# Patient Record
Sex: MALE | Race: WHITE | NOT HISPANIC OR LATINO | URBAN - METROPOLITAN AREA
[De-identification: names, ages, dates, MRNs, and addresses within clinical notes are randomized per-mention and may not be internally consistent; named-entity substitution may affect disease eponyms.]

---

## 2020-01-03 PROBLEM — Z00.00 ENCOUNTER FOR PREVENTIVE HEALTH EXAMINATION: Status: ACTIVE | Noted: 2020-01-03

## 2020-01-06 ENCOUNTER — OUTPATIENT (OUTPATIENT)
Dept: OUTPATIENT SERVICES | Facility: HOSPITAL | Age: 55
LOS: 1 days | Discharge: HOME | End: 2020-01-06
Payer: COMMERCIAL

## 2020-01-06 DIAGNOSIS — C67.9 MALIGNANT NEOPLASM OF BLADDER, UNSPECIFIED: ICD-10-CM

## 2020-01-07 ENCOUNTER — LABORATORY RESULT (OUTPATIENT)
Age: 55
End: 2020-01-07

## 2020-01-07 ENCOUNTER — APPOINTMENT (OUTPATIENT)
Dept: HEMATOLOGY ONCOLOGY | Facility: CLINIC | Age: 55
End: 2020-01-07
Payer: COMMERCIAL

## 2020-01-07 ENCOUNTER — RESULT REVIEW (OUTPATIENT)
Age: 55
End: 2020-01-07

## 2020-01-07 VITALS
BODY MASS INDEX: 21.49 KG/M2 | HEIGHT: 71.26 IN | OXYGEN SATURATION: 98 % | SYSTOLIC BLOOD PRESSURE: 117 MMHG | RESPIRATION RATE: 16 BRPM | TEMPERATURE: 98.6 F | DIASTOLIC BLOOD PRESSURE: 73 MMHG | HEART RATE: 87 BPM | WEIGHT: 155.18 LBS

## 2020-01-07 DIAGNOSIS — Z80.0 FAMILY HISTORY OF MALIGNANT NEOPLASM OF DIGESTIVE ORGANS: ICD-10-CM

## 2020-01-07 DIAGNOSIS — Z85.048 PERSONAL HISTORY OF OTHER MALIGNANT NEOPLASM OF RECTUM, RECTOSIGMOID JUNCTION, AND ANUS: ICD-10-CM

## 2020-01-07 DIAGNOSIS — Z78.9 OTHER SPECIFIED HEALTH STATUS: ICD-10-CM

## 2020-01-07 LAB
BASOPHILS # BLD AUTO: 0.1 K/UL — SIGNIFICANT CHANGE UP (ref 0–0.2)
BASOPHILS NFR BLD AUTO: 1 % — SIGNIFICANT CHANGE UP (ref 0–2)
EOSINOPHIL # BLD AUTO: 0.1 K/UL — SIGNIFICANT CHANGE UP (ref 0–0.5)
EOSINOPHIL NFR BLD AUTO: 1.9 % — SIGNIFICANT CHANGE UP (ref 0–6)
HCT VFR BLD CALC: 38.6 % — LOW (ref 39–50)
HGB BLD-MCNC: 13.1 G/DL — SIGNIFICANT CHANGE UP (ref 13–17)
LYMPHOCYTES # BLD AUTO: 1.7 K/UL — SIGNIFICANT CHANGE UP (ref 1–3.3)
LYMPHOCYTES # BLD AUTO: 23.7 % — SIGNIFICANT CHANGE UP (ref 13–44)
MCHC RBC-ENTMCNC: 31.2 PG — SIGNIFICANT CHANGE UP (ref 27–34)
MCHC RBC-ENTMCNC: 34 G/DL — SIGNIFICANT CHANGE UP (ref 32–36)
MCV RBC AUTO: 91.7 FL — SIGNIFICANT CHANGE UP (ref 80–100)
MONOCYTES # BLD AUTO: 0.3 K/UL — SIGNIFICANT CHANGE UP (ref 0–0.9)
MONOCYTES NFR BLD AUTO: 4.7 % — SIGNIFICANT CHANGE UP (ref 2–14)
NEUTROPHILS # BLD AUTO: 4.8 K/UL — SIGNIFICANT CHANGE UP (ref 1.8–7.4)
NEUTROPHILS NFR BLD AUTO: 68.8 % — SIGNIFICANT CHANGE UP (ref 43–77)
PLATELET # BLD AUTO: 303 K/UL — SIGNIFICANT CHANGE UP (ref 150–400)
RBC # BLD: 4.21 M/UL — SIGNIFICANT CHANGE UP (ref 4.2–5.8)
RBC # FLD: 13.6 % — SIGNIFICANT CHANGE UP (ref 10.3–14.5)
WBC # BLD: 7 K/UL — SIGNIFICANT CHANGE UP (ref 3.8–10.5)
WBC # FLD AUTO: 7 K/UL — SIGNIFICANT CHANGE UP (ref 3.8–10.5)

## 2020-01-07 PROCEDURE — 99205 OFFICE O/P NEW HI 60 MIN: CPT

## 2020-01-08 LAB
ALBUMIN SERPL ELPH-MCNC: 4.7 G/DL
ALP BLD-CCNC: 70 U/L
ALT SERPL-CCNC: 20 U/L
ANION GAP SERPL CALC-SCNC: 13 MMOL/L
APTT BLD: 29.6 SEC
AST SERPL-CCNC: 18 U/L
BILIRUB SERPL-MCNC: 0.3 MG/DL
BUN SERPL-MCNC: 19 MG/DL
CALCIUM SERPL-MCNC: 9.9 MG/DL
CEA SERPL-MCNC: 0.9 NG/ML
CHLORIDE SERPL-SCNC: 102 MMOL/L
CO2 SERPL-SCNC: 24 MMOL/L
CREAT SERPL-MCNC: 0.84 MG/DL
FERRITIN SERPL-MCNC: 87 NG/ML
GLUCOSE SERPL-MCNC: 98 MG/DL
HBV CORE IGG+IGM SER QL: NONREACTIVE
HBV SURFACE AB SER QL: NONREACTIVE
HBV SURFACE AG SER QL: NONREACTIVE
HCV AB SER QL: NONREACTIVE
HCV S/CO RATIO: 0.17 S/CO
INR PPP: 0.98 RATIO
POTASSIUM SERPL-SCNC: 4.4 MMOL/L
PROT SERPL-MCNC: 7.4 G/DL
PT BLD: 11.3 SEC
SODIUM SERPL-SCNC: 139 MMOL/L

## 2020-01-08 NOTE — HISTORY OF PRESENT ILLNESS
[Disease: _____________________] : Disease: [unfilled] [T: ___] : T[unfilled] [N: ___] : N[unfilled] [M: ___] : M[unfilled] [AJCC Stage: ____] : AJCC Stage: [unfilled] [de-identified] : Mr. Mccullough is a 54 year old gentlemen with no significant past medical history presenting to the office for an initial consultation of invasive adenocarcinoma.\par \par He initially presented to [facility] with acute abdominal pain, which progressively worsened throughout day.\par CT abdomen/pelvis: reported rectosigmoid perforation with possible mass of uncertain etiology (Bladder, prostate, colon/rectum).  \par He was transferred to [facility] on 11/14/2019.\par \par CT was reviewed at [facility] by surgery, and there was extensive air and possible extravasation of stool around the rectosigmoid and sigmoid, adjacent loops of thickened small bowel, free fluid in pelvis, locules of air above liver.\par Accordingly, he was resuscitated and taken urgently to the operating room for exploratory laparotomy, Vahe's procedure, abdominal washout and colostomy with Dr. Zulema Diego. There was an obstructing mass at high rectum with large proximal perforation (Approximately 75% circumferential separation of the colon wall) with feculent peritonitis.\par \par Pt was discharged on 12/6/2019. Pt reports that he had CAT chest and brain MRI which reportedly did NOT show distant metastatic disease. \par \par Surgical Pathology (Read at [place], pending re-review at [facility]).\par A) Rectosigmoid: Invasive adenocarcinoma, moderately differentiated, invading through the muscularis propria into pericolorectal tissue.\par Lymphovascular invasion present (read as suspicious).\par Perineural Invasion: Not identified.\par Tumor Budding: Intermediate score.\par Perforation site located in the uninvolved colon 1.5 cm proximal to the obstructing tumor and is negative fo carcinoma.\par Margins are negative for dysplasia and carcinoma.  \par Sixteen benign lymph node (0/16).\par \par B) Portion of sigmoid colon:\par Negative for malignancy.\par MLH1, MSH2, MSH6, PMS2: Intact nuclear expression.\par pT3 pN0\par \par  [de-identified] : adenocarcinoma [de-identified] : Since discharge on 12/6, pt has been recovering and regaining weight. Wound heals well and appetite is good with soft bowel movement. Denies pain. \par \par Surgery at [place]: Zulema Diego [phone]; 2\par PCP: Jovanni Schilling; [phone]\par GI: Washington Chahal;  [phone]\par \par Patient cell [phone]\par Wife (Kristyn) [phone]

## 2020-01-08 NOTE — CONSULT LETTER
[Dear  ___] : Dear  [unfilled], [Consult Letter:] : I had the pleasure of evaluating your patient, [unfilled]. [Please see my note below.] : Please see my note below. [Consult Closing:] : Thank you very much for allowing me to participate in the care of this patient.  If you have any questions, please do not hesitate to contact me. [Sincerely,] : Sincerely, [DrAnton  ___] : Dr. LOUIS [DrAnton ___] : Dr. LOUIS

## 2020-01-08 NOTE — REVIEW OF SYSTEMS
[Patient Intake Form Reviewed] : Patient intake form was reviewed [Negative] : Allergic/Immunologic [FreeTextEntry7] : His colostomy is intact, and stool is relatively firm.  [FreeTextEntry2] : He has lost > 20 pounds and recently began to gain back ~ 6 pounds.

## 2020-01-08 NOTE — PHYSICAL EXAM
[Normal] : affect appropriate [Restricted in physically strenuous activity but ambulatory and able to carry out work of a light or sedentary nature] : Status 1- Restricted in physically strenuous activity but ambulatory and able to carry out work of a light or sedentary nature, e.g., light house work, office work [de-identified] : There is a full midline abdominal incision that appears to be healing well. No drainage is noted. Colostomy bag with soft brown stool is intact.

## 2020-01-09 ENCOUNTER — RESULT REVIEW (OUTPATIENT)
Age: 55
End: 2020-01-09

## 2020-01-09 PROCEDURE — 88321 CONSLTJ&REPRT SLD PREP ELSWR: CPT

## 2020-01-17 ENCOUNTER — TRANSCRIPTION ENCOUNTER (OUTPATIENT)
Age: 55
End: 2020-01-17

## 2020-02-19 NOTE — CONSULT LETTER
[Consult Letter:] : I had the pleasure of evaluating your patient, [unfilled]. [Dear  ___] : Dear  [unfilled], [Consult Closing:] : Thank you very much for allowing me to participate in the care of this patient.  If you have any questions, please do not hesitate to contact me. [Please see my note below.] : Please see my note below. [Sincerely,] : Sincerely, [DrAnton ___] : Dr. LOUIS [DrAnton  ___] : Dr. LOUIS

## 2020-02-20 ENCOUNTER — RESULT REVIEW (OUTPATIENT)
Age: 55
End: 2020-02-20

## 2020-02-20 ENCOUNTER — APPOINTMENT (OUTPATIENT)
Dept: HEMATOLOGY ONCOLOGY | Facility: CLINIC | Age: 55
End: 2020-02-20
Payer: COMMERCIAL

## 2020-02-20 ENCOUNTER — OUTPATIENT (OUTPATIENT)
Dept: OUTPATIENT SERVICES | Facility: HOSPITAL | Age: 55
LOS: 1 days | Discharge: HOME | End: 2020-02-20

## 2020-02-20 VITALS
HEART RATE: 78 BPM | RESPIRATION RATE: 16 BRPM | TEMPERATURE: 98.1 F | WEIGHT: 156.73 LBS | OXYGEN SATURATION: 98 % | DIASTOLIC BLOOD PRESSURE: 80 MMHG | BODY MASS INDEX: 21.7 KG/M2 | SYSTOLIC BLOOD PRESSURE: 123 MMHG

## 2020-02-20 DIAGNOSIS — C67.9 MALIGNANT NEOPLASM OF BLADDER, UNSPECIFIED: ICD-10-CM

## 2020-02-20 LAB
BASOPHILS # BLD AUTO: 0.1 K/UL — SIGNIFICANT CHANGE UP (ref 0–0.2)
BASOPHILS NFR BLD AUTO: 0.9 % — SIGNIFICANT CHANGE UP (ref 0–2)
EOSINOPHIL # BLD AUTO: 0.1 K/UL — SIGNIFICANT CHANGE UP (ref 0–0.5)
EOSINOPHIL NFR BLD AUTO: 1.5 % — SIGNIFICANT CHANGE UP (ref 0–6)
HCT VFR BLD CALC: 41.4 % — SIGNIFICANT CHANGE UP (ref 39–50)
HGB BLD-MCNC: 14.4 G/DL — SIGNIFICANT CHANGE UP (ref 13–17)
LYMPHOCYTES # BLD AUTO: 1.4 K/UL — SIGNIFICANT CHANGE UP (ref 1–3.3)
LYMPHOCYTES # BLD AUTO: 18.8 % — SIGNIFICANT CHANGE UP (ref 13–44)
MCHC RBC-ENTMCNC: 32.1 PG — SIGNIFICANT CHANGE UP (ref 27–34)
MCHC RBC-ENTMCNC: 34.9 G/DL — SIGNIFICANT CHANGE UP (ref 32–36)
MCV RBC AUTO: 92.1 FL — SIGNIFICANT CHANGE UP (ref 80–100)
MONOCYTES # BLD AUTO: 0.4 K/UL — SIGNIFICANT CHANGE UP (ref 0–0.9)
MONOCYTES NFR BLD AUTO: 5 % — SIGNIFICANT CHANGE UP (ref 2–14)
NEUTROPHILS # BLD AUTO: 5.4 K/UL — SIGNIFICANT CHANGE UP (ref 1.8–7.4)
NEUTROPHILS NFR BLD AUTO: 73.8 % — SIGNIFICANT CHANGE UP (ref 43–77)
PLATELET # BLD AUTO: 297 K/UL — SIGNIFICANT CHANGE UP (ref 150–400)
RBC # BLD: 4.5 M/UL — SIGNIFICANT CHANGE UP (ref 4.2–5.8)
RBC # FLD: 14.8 % — HIGH (ref 10.3–14.5)
WBC # BLD: 7.4 K/UL — SIGNIFICANT CHANGE UP (ref 3.8–10.5)
WBC # FLD AUTO: 7.4 K/UL — SIGNIFICANT CHANGE UP (ref 3.8–10.5)

## 2020-02-20 PROCEDURE — 99214 OFFICE O/P EST MOD 30 MIN: CPT

## 2020-02-20 RX ORDER — PANTOPRAZOLE SODIUM 40 MG/1
40 TABLET, DELAYED RELEASE ORAL DAILY
Refills: 0 | Status: DISCONTINUED | COMMUNITY
Start: 2020-01-07 | End: 2020-02-20

## 2020-02-20 RX ORDER — METOPROLOL SUCCINATE 25 MG/1
25 TABLET, EXTENDED RELEASE ORAL DAILY
Refills: 0 | Status: DISCONTINUED | COMMUNITY
Start: 2020-01-07 | End: 2020-02-20

## 2020-02-20 NOTE — HISTORY OF PRESENT ILLNESS
[Disease: _____________________] : Disease: [unfilled] [N: ___] : N[unfilled] [T: ___] : T[unfilled] [AJCC Stage: ____] : AJCC Stage: [unfilled] [M: ___] : M[unfilled] [de-identified] : Mr. Mccullough is a 54 year old gentlemen with no significant past medical history presenting to the office for an initial consultation of invasive adenocarcinoma.\par \par He initially presented to [facility] with acute abdominal pain, which progressively worsened throughout day.\par CT abdomen/pelvis: reported rectosigmoid perforation with possible mass of uncertain etiology (Bladder, prostate, colon/rectum).  \par He was transferred to [facility] on 11/14/2019.\par \par CT was reviewed at [facility] by surgery, and there was extensive air and possible extravasation of stool around the rectosigmoid and sigmoid, adjacent loops of thickened small bowel, free fluid in pelvis, locules of air above liver.\par Accordingly, he was resuscitated and taken urgently to the operating room for exploratory laparotomy, Vahe's procedure, abdominal washout and colostomy with Dr. Zulema Diego. There was an obstructing mass at high rectum with large proximal perforation (Approximately 75% circumferential separation of the colon wall) with feculent peritonitis.\par \par Pt was discharged on 12/6/2019. Pt reports that he had CAT chest and brain MRI which reportedly did NOT show distant metastatic disease. \par \par Surgical Pathology (Read at [place], pending re-review at [facility]).\par A) Rectosigmoid: Invasive adenocarcinoma, moderately differentiated, invading through the muscularis propria into pericolorectal tissue.\par Lymphovascular invasion present (read as suspicious).\par Perineural Invasion: Not identified.\par Tumor Budding: Intermediate score.\par Perforation site located in the uninvolved colon 1.5 cm proximal to the obstructing tumor and is negative fo carcinoma.\par Margins are negative for dysplasia and carcinoma.  \par Sixteen benign lymph node (0/16).\par \par B) Portion of sigmoid colon:\par Negative for malignancy.\par MLH1, MSH2, MSH6, PMS2: Intact nuclear expression.\par pT3 pN0\par \par 2/20/2020: Patient is here for follow up for stage 2 rectal CA.  He was started on adjuvant PO Capecitabine.  C1D1 was started on 2/3/2020.  He is tolerating tablets extremely well and reports no significant adverse events.  He denies fever, chills, mouth sores, diarrhea, change in taste or HFS.  He continues to remain active and has no restrictions with his ADLs.  He is working full time.\par  [de-identified] : adenocarcinoma [de-identified] : Since discharge on 12/6, pt has been recovering and regaining weight. Wound heals well and appetite is good with soft bowel movement. Denies pain. \par \par Surgery at [place]: Zulema Diego [phone]; 2\par PCP: Jovanni Schilling; [phone]\par GI: Washington Chahal;  [phone]\par \par Patient cell [phone]\par Wife (Kristyn) [phone]

## 2020-02-20 NOTE — PHYSICAL EXAM
[Restricted in physically strenuous activity but ambulatory and able to carry out work of a light or sedentary nature] : Status 1- Restricted in physically strenuous activity but ambulatory and able to carry out work of a light or sedentary nature, e.g., light house work, office work [Normal] : grossly intact [de-identified] : There is a full midline abdominal incision that appears to be healing well. No drainage is noted. Colostomy bag with soft brown stool is intact.

## 2020-02-20 NOTE — REVIEW OF SYSTEMS
[Patient Intake Form Reviewed] : Patient intake form was reviewed [Negative] : Allergic/Immunologic [FreeTextEntry2] : Patient has gained ~ 2 lbs since last visit x one month. [FreeTextEntry7] : His colostomy is intact, and stool is relatively firm.

## 2020-02-21 LAB
ALBUMIN SERPL ELPH-MCNC: 4.8 G/DL
ALP BLD-CCNC: 58 U/L
ALT SERPL-CCNC: 12 U/L
ANION GAP SERPL CALC-SCNC: 11 MMOL/L
AST SERPL-CCNC: 17 U/L
BILIRUB SERPL-MCNC: 0.2 MG/DL
BUN SERPL-MCNC: 14 MG/DL
CALCIUM SERPL-MCNC: 9.4 MG/DL
CEA SERPL-MCNC: 1 NG/ML
CHLORIDE SERPL-SCNC: 105 MMOL/L
CO2 SERPL-SCNC: 26 MMOL/L
CREAT SERPL-MCNC: 0.86 MG/DL
GLUCOSE SERPL-MCNC: 93 MG/DL
POTASSIUM SERPL-SCNC: 4.5 MMOL/L
PROT SERPL-MCNC: 7.2 G/DL
SODIUM SERPL-SCNC: 142 MMOL/L

## 2020-03-26 ENCOUNTER — OUTPATIENT (OUTPATIENT)
Dept: OUTPATIENT SERVICES | Facility: HOSPITAL | Age: 55
LOS: 1 days | Discharge: HOME | End: 2020-03-26

## 2020-03-26 DIAGNOSIS — C67.9 MALIGNANT NEOPLASM OF BLADDER, UNSPECIFIED: ICD-10-CM

## 2020-04-01 ENCOUNTER — APPOINTMENT (OUTPATIENT)
Dept: HEMATOLOGY ONCOLOGY | Facility: CLINIC | Age: 55
End: 2020-04-01

## 2020-04-29 ENCOUNTER — OUTPATIENT (OUTPATIENT)
Dept: OUTPATIENT SERVICES | Facility: HOSPITAL | Age: 55
LOS: 1 days | Discharge: HOME | End: 2020-04-29

## 2020-04-29 ENCOUNTER — APPOINTMENT (OUTPATIENT)
Dept: HEMATOLOGY ONCOLOGY | Facility: CLINIC | Age: 55
End: 2020-04-29
Payer: COMMERCIAL

## 2020-04-29 DIAGNOSIS — C67.9 MALIGNANT NEOPLASM OF BLADDER, UNSPECIFIED: ICD-10-CM

## 2020-04-29 PROCEDURE — 99214 OFFICE O/P EST MOD 30 MIN: CPT | Mod: 95

## 2020-04-29 NOTE — PHYSICAL EXAM
[Restricted in physically strenuous activity but ambulatory and able to carry out work of a light or sedentary nature] : Status 1- Restricted in physically strenuous activity but ambulatory and able to carry out work of a light or sedentary nature, e.g., light house work, office work [Normal] : affect appropriate [de-identified] : There is a full midline abdominal incision that appears to be healing well. No drainage is noted. Colostomy bag with soft brown stool is intact.

## 2020-04-29 NOTE — HISTORY OF PRESENT ILLNESS
[Disease: _____________________] : Disease: [unfilled] [T: ___] : T[unfilled] [N: ___] : N[unfilled] [M: ___] : M[unfilled] [AJCC Stage: ____] : AJCC Stage: [unfilled] [Home] : at home, [unfilled] , at the time of the visit. [Medical Office: ([facility])___] : at the medical office located in  [Patient] : the patient [de-identified] : Mr. Mccullough is a 54 year old gentlemen with no significant past medical history presenting to the office for an initial consultation of invasive adenocarcinoma.\par \par He initially presented to [facility] with acute abdominal pain, which progressively worsened throughout day.\par CT abdomen/pelvis: reported rectosigmoid perforation with possible mass of uncertain etiology (Bladder, prostate, colon/rectum).  \par He was transferred to [facility] on 11/14/2019.\par \par CT was reviewed at [facility] by surgery, and there was extensive air and possible extravasation of stool around the rectosigmoid and sigmoid, adjacent loops of thickened small bowel, free fluid in pelvis, locules of air above liver.\par Accordingly, he was resuscitated and taken urgently to the operating room for exploratory laparotomy, Vahe's procedure, abdominal washout and colostomy with Dr. Zulema Diego. There was an obstructing mass at high rectum with large proximal perforation (Approximately 75% circumferential separation of the colon wall) with feculent peritonitis.\par \par Pt was discharged on 12/6/2019. Pt reports that he had CAT chest and brain MRI which reportedly did NOT show distant metastatic disease. \par \par Surgical Pathology (Read at [place], pending re-review at [facility]).\par A) Rectosigmoid: Invasive adenocarcinoma, moderately differentiated, invading through the muscularis propria into pericolorectal tissue.\par Lymphovascular invasion present (read as suspicious).\par Perineural Invasion: Not identified.\par Tumor Budding: Intermediate score.\par Perforation site located in the uninvolved colon 1.5 cm proximal to the obstructing tumor and is negative fo carcinoma.\par Margins are negative for dysplasia and carcinoma.  \par Sixteen benign lymph node (0/16).\par \par B) Portion of sigmoid colon:\par Negative for malignancy.\par MLH1, MSH2, MSH6, PMS2: Intact nuclear expression.\par pT3 pN0\par \par 2/20/2020: Patient is here for follow up for stage 2 rectal CA.  He was started on adjuvant PO Capecitabine.  C1D1 was started on 2/3/2020.  He is tolerating tablets extremely well and reports no significant adverse events.  He denies fever, chills, mouth sores, diarrhea, change in taste or HFS.  He continues to remain active and has no restrictions with his ADLs.  He is working full time.\par \par \par 4/29/2020: TELEHEATH Video conference performed on 4/29/2020 with Mr. Mccullough.  Verbal consent obtained.\par 1) Rectal CA: Patient is currently on adjuvant PO Xeloda.  Patient started C4 on 4/27/2020.  As per patient he's tolerating medication moderately well.  Admits to mild HFS located on his foot and not hands.  States foot are mildly swollen and red.  No blistering or lacerations noted.  He is able to ambulate without any restrictions.  \par Patient denies fever, chills, mouth sores or chest pain.  Admits to intermittent diarrhea ~ 1-2 per day however no diarrhea today. \par We reviewed blood work done this week at local [facility] which was unremarkable.  CEA < 0.5\par 2) Agitation:While on the tablets patient feels he’s having issues with sleep and is more irritable. A friend of his suggested THC gummies cause she was having the same issues while on treatment which worked.He was also inquiring about medical marijuana card.\par  [de-identified] : adenocarcinoma [de-identified] : Since discharge on 12/6, pt has been recovering and regaining weight. Wound heals well and appetite is good with soft bowel movement. Denies pain. \par \par Surgery at [place]: Zulema Diego [phone]; 2\par PCP: Jovanni Schilling; [phone]\par GI: Washington Chahal;  [phone]\par \par Patient cell [phone]\par Wife (Kristyn) [phone]

## 2020-04-29 NOTE — REVIEW OF SYSTEMS
[Patient Intake Form Reviewed] : Patient intake form was reviewed [Negative] : Allergic/Immunologic [FreeTextEntry2] : Patient has gained ~ 2 lbs since last visit x one month. [FreeTextEntry7] : His colostomy is intact, and stool is relatively firm.  [de-identified] : mild agitation.

## 2020-05-06 ENCOUNTER — APPOINTMENT (OUTPATIENT)
Dept: HEMATOLOGY ONCOLOGY | Facility: CLINIC | Age: 55
End: 2020-05-06
Payer: COMMERCIAL

## 2020-05-06 DIAGNOSIS — R45.4 IRRITABILITY AND ANGER: ICD-10-CM

## 2020-05-06 DIAGNOSIS — L27.1 LOCALIZED SKIN ERUPTION DUE TO DRUGS AND MEDICAMENTS TAKEN INTERNALLY: ICD-10-CM

## 2020-05-06 PROCEDURE — 99203 OFFICE O/P NEW LOW 30 MIN: CPT | Mod: 95

## 2020-05-06 NOTE — HISTORY OF PRESENT ILLNESS
[FreeTextEntry1] : 55yoM with rectal adenocarcinoma (dx 11/2019) presents for initial palliative care visit, referred by Oncology.\par \par On 11/14/19 he presented to [facility] emergently with a bowel perforation. He underwent emergent surgery, was found to have T3N0 disease. \par \par He is on adjuvant Xeloda (2 weeks on/ 1 weeks off)\par He is finding that as time goes on  his AEs from the xeloda are heightening.  He finds he gets restless/agitated during the time he is on the drug. \par His feet are getting red and painful. He applies the recommended topical creams and gets some relief with them. He is interested in initiating medicinal cannabis for his pain. Has not used cannabis since he was in college. \par \par ROS:\par +occasional nausea - no vomiting \par +occasional alternating constipation and diarrhea\par +trouble sleeping 2/2 agitation \par Denies SOB, \par Appetite is good\par \par Patient is , lives with his wife and their two sons ages 26 and 19. \par \par Prior to his diagnosis he was an avid runner. \par \par [employer] Ref#: [id number]

## 2020-05-06 NOTE — ASSESSMENT
[FreeTextEntry1] : 55yoM with:\par \par 1. Rectal adenocarcinoma - currently on Xeloda.  Follow up with Med Onc. \par \par 2. Foot pain 2/2 Xeloda - Medical cannabis certification completed today. Provided cannabis education, overview of state program, and discussed adverse effects in detail. Counseled that vaporized cannabis is not the preferred route of administration due to the fact that both short-term and long-term risks associated with vaporizing oils are not yet fully understood. Recommend starting with 1:1 THC:CBD formulation at low dose of THC (~2mg/dose).\par \par 3. Irritability/agitation - Patient finds that this is occurring when he is on the Xeloda. He is hopeful the medicinal cannabis will provide a relaxing effect, which is likely. \par \par Follow up in 1 month or as needed, call with questions or issues.

## 2020-07-13 ENCOUNTER — OUTPATIENT (OUTPATIENT)
Dept: OUTPATIENT SERVICES | Facility: HOSPITAL | Age: 55
LOS: 1 days | Discharge: HOME | End: 2020-07-13

## 2020-07-13 DIAGNOSIS — C67.9 MALIGNANT NEOPLASM OF BLADDER, UNSPECIFIED: ICD-10-CM

## 2020-07-14 ENCOUNTER — RESULT REVIEW (OUTPATIENT)
Age: 55
End: 2020-07-14

## 2020-07-14 ENCOUNTER — APPOINTMENT (OUTPATIENT)
Dept: HEMATOLOGY ONCOLOGY | Facility: CLINIC | Age: 55
End: 2020-07-14
Payer: COMMERCIAL

## 2020-07-14 VITALS
TEMPERATURE: 98 F | RESPIRATION RATE: 14 BRPM | BODY MASS INDEX: 22.74 KG/M2 | DIASTOLIC BLOOD PRESSURE: 80 MMHG | SYSTOLIC BLOOD PRESSURE: 135 MMHG | WEIGHT: 164.24 LBS | HEART RATE: 82 BPM | OXYGEN SATURATION: 99 %

## 2020-07-14 LAB
BASOPHILS # BLD AUTO: 0.05 K/UL — SIGNIFICANT CHANGE UP (ref 0–0.2)
BASOPHILS NFR BLD AUTO: 0.9 % — SIGNIFICANT CHANGE UP (ref 0–2)
EOSINOPHIL # BLD AUTO: 0.06 K/UL — SIGNIFICANT CHANGE UP (ref 0–0.5)
EOSINOPHIL NFR BLD AUTO: 1.1 % — SIGNIFICANT CHANGE UP (ref 0–6)
HCT VFR BLD CALC: 39.4 % — SIGNIFICANT CHANGE UP (ref 39–50)
HGB BLD-MCNC: 14.3 G/DL — SIGNIFICANT CHANGE UP (ref 13–17)
IMM GRANULOCYTES NFR BLD AUTO: 0.4 % — SIGNIFICANT CHANGE UP (ref 0–1.5)
LYMPHOCYTES # BLD AUTO: 1.15 K/UL — SIGNIFICANT CHANGE UP (ref 1–3.3)
LYMPHOCYTES # BLD AUTO: 20.4 % — SIGNIFICANT CHANGE UP (ref 13–44)
MCHC RBC-ENTMCNC: 35.2 PG — HIGH (ref 27–34)
MCHC RBC-ENTMCNC: 36.3 GM/DL — HIGH (ref 32–36)
MCV RBC AUTO: 97 FL — SIGNIFICANT CHANGE UP (ref 80–100)
MONOCYTES # BLD AUTO: 0.42 K/UL — SIGNIFICANT CHANGE UP (ref 0–0.9)
MONOCYTES NFR BLD AUTO: 7.4 % — SIGNIFICANT CHANGE UP (ref 2–14)
NEUTROPHILS # BLD AUTO: 3.95 K/UL — SIGNIFICANT CHANGE UP (ref 1.8–7.4)
NEUTROPHILS NFR BLD AUTO: 69.8 % — SIGNIFICANT CHANGE UP (ref 43–77)
NRBC # BLD: 0 /100 WBCS — SIGNIFICANT CHANGE UP (ref 0–0)
PLATELET # BLD AUTO: 259 K/UL — SIGNIFICANT CHANGE UP (ref 150–400)
RBC # BLD: 4.06 M/UL — LOW (ref 4.2–5.8)
RBC # FLD: 15 % — HIGH (ref 10.3–14.5)
WBC # BLD: 5.65 K/UL — SIGNIFICANT CHANGE UP (ref 3.8–10.5)
WBC # FLD AUTO: 5.65 K/UL — SIGNIFICANT CHANGE UP (ref 3.8–10.5)

## 2020-07-14 PROCEDURE — 99214 OFFICE O/P EST MOD 30 MIN: CPT

## 2020-07-14 NOTE — HISTORY OF PRESENT ILLNESS
[Disease: _____________________] : Disease: [unfilled] [T: ___] : T[unfilled] [N: ___] : N[unfilled] [M: ___] : M[unfilled] [AJCC Stage: ____] : AJCC Stage: [unfilled] [Home] : at home, [unfilled] , at the time of the visit. [Medical Office: ([facility])___] : at the medical office located in  [Patient] : the patient [de-identified] : Patient with no significant past medical history presenting to the office for an initial consultation of invasive colon adenocarcinoma.\par \par He initially presented to [facility] with acute abdominal pain, which progressively worsened throughout day.\par CT abdomen/pelvis: reported rectosigmoid perforation with possible mass of uncertain etiology (Bladder, prostate, colon/rectum).  \par He was transferred to [facility] on 11/14/2019.\par \par CT was reviewed at [facility] by surgery, and there was extensive air and possible extravasation of stool around the rectosigmoid and sigmoid, adjacent loops of thickened small bowel, free fluid in pelvis, locules of air above liver.\par Accordingly, he was resuscitated and taken urgently to the operating room for exploratory laparotomy, Vahe's procedure, abdominal washout and colostomy with Dr. Zulema Diego. There was an obstructing mass at high rectum with large proximal perforation (Approximately 75% circumferential separation of the colon wall) with feculent peritonitis.\par \par Pt was discharged on 12/6/2019. Pt reports that he had CAT chest and brain MRI which reportedly did NOT show distant metastatic disease. \par \par Surgical Pathology (Read at [place], pending re-review at [facility]).\par A) Rectosigmoid: Invasive adenocarcinoma, moderately differentiated, invading through the muscularis propria into pericolorectal tissue.\par Lymphovascular invasion present (read as suspicious).\par Perineural Invasion: Not identified.\par Tumor Budding: Intermediate score.\par Perforation site located in the uninvolved colon 1.5 cm proximal to the obstructing tumor and is negative for carcinoma.\par Margins are negative for dysplasia and carcinoma.  \par Sixteen benign lymph node (0/16).\par \par B) Portion of sigmoid colon:\par Negative for malignancy.\par MLH1, MSH2, MSH6, PMS2: Intact nuclear expression.\par pT3 pN0\par \par 2/20/2020: Patient is here for follow up for stage 2 rectal CA.  He was started on adjuvant PO Capecitabine.  C1D1 was started on 2/3/2020.  He is tolerating tablets extremely well and reports no significant adverse events.  He denies fever, chills, mouth sores, diarrhea, change in taste or HFS.  He continues to remain active and has no restrictions with his ADLs.  He is working full time.\par \par 4/29/2020: TELEHEATH Video conference performed on 4/29/2020 with Mr. Mccullough.  Verbal consent obtained.\par 1) Rectal CA: Patient is currently on adjuvant PO Xeloda.  Patient started C4 on 4/27/2020.  As per patient he's tolerating medication moderately well.  Admits to mild HFS located on his foot and not hands.  States foot are mildly swollen and red.  No blistering or lacerations noted.  He is able to ambulate without any restrictions.  \par Patient denies fever, chills, mouth sores or chest pain.  Admits to intermittent diarrhea ~ 1-2 per day however no diarrhea today. \par We reviewed blood work done this week at [facility] which was unremarkable.  CEA < 0.5\par 2) Agitation:While on the tablets patient feels he’s having issues with sleep and is more irritable. A friend of his suggested THC gummies cause she was having the same issues while on treatment which worked.He was also inquiring about medical marijuana card.\par \par 7/14/2020:\par 1) Rectal CA: Patient is currently on adjuvant PO Xeloda.  Patient started C1D1 on 2/3/2020.  As per patient he's tolerating medication well. \par Patient denies fever, chills, mouth sores or chest pain.  Admits to intermittent diarrhea ~ 1-2 per day however no diarrhea today. \par We reviewed blood work done this week at [facility] which was unremarkable.  CEA < 0.5\par Will meet head of colon surgery at [facility]. \par 2) Agitation:While on the tablets patient feels he’s having issues with sleep and is more irritable. A friend of his suggested THC gummies with relief.\par  [de-identified] : adenocarcinoma [de-identified] : Since discharge on 12/6, pt has been recovering and regaining weight. Wound heals well and appetite is good with soft bowel movement. Denies pain. \par \par Surgery at [place]: Dannielle Escobar (previously Zulema Diego)\par PCP: Jovanni Schilling; [phone]\par GI: Washington Chahal;  [phone]\par \par Patient cell [phone]\par Wife (Kristyn) [phone]

## 2020-07-14 NOTE — PHYSICAL EXAM
[Restricted in physically strenuous activity but ambulatory and able to carry out work of a light or sedentary nature] : Status 1- Restricted in physically strenuous activity but ambulatory and able to carry out work of a light or sedentary nature, e.g., light house work, office work [Normal] : affect appropriate [de-identified] : No masses. Colostomy bag is intact.

## 2020-07-14 NOTE — REVIEW OF SYSTEMS
[Patient Intake Form Reviewed] : Patient intake form was reviewed [Negative] : Allergic/Immunologic [FreeTextEntry2] : Patient has gained ~ 2 lbs since last visit x one month. [FreeTextEntry7] : His colostomy is intact, and stool is relatively firm.  [de-identified] : mild agitation.

## 2020-07-15 LAB
ALBUMIN SERPL ELPH-MCNC: 5 G/DL
ALP BLD-CCNC: 55 U/L
ALT SERPL-CCNC: 14 U/L
ANION GAP SERPL CALC-SCNC: 14 MMOL/L
AST SERPL-CCNC: 19 U/L
BILIRUB SERPL-MCNC: 0.6 MG/DL
BUN SERPL-MCNC: 15 MG/DL
CALCIUM SERPL-MCNC: 9.4 MG/DL
CEA SERPL-MCNC: 1.4 NG/ML
CHLORIDE SERPL-SCNC: 105 MMOL/L
CO2 SERPL-SCNC: 22 MMOL/L
CREAT SERPL-MCNC: 0.86 MG/DL
GLUCOSE SERPL-MCNC: 103 MG/DL
POTASSIUM SERPL-SCNC: 4.4 MMOL/L
PROT SERPL-MCNC: 7.1 G/DL
SODIUM SERPL-SCNC: 141 MMOL/L

## 2020-09-09 ENCOUNTER — TRANSCRIPTION ENCOUNTER (OUTPATIENT)
Age: 55
End: 2020-09-09

## 2020-10-29 ENCOUNTER — TRANSCRIPTION ENCOUNTER (OUTPATIENT)
Age: 55
End: 2020-10-29

## 2021-02-16 ENCOUNTER — TRANSCRIPTION ENCOUNTER (OUTPATIENT)
Age: 56
End: 2021-02-16

## 2021-02-22 ENCOUNTER — TRANSCRIPTION ENCOUNTER (OUTPATIENT)
Age: 56
End: 2021-02-22

## 2022-03-30 ENCOUNTER — TRANSCRIPTION ENCOUNTER (OUTPATIENT)
Age: 57
End: 2022-03-30

## 2022-04-04 ENCOUNTER — APPOINTMENT (OUTPATIENT)
Dept: HEMATOLOGY ONCOLOGY | Facility: CLINIC | Age: 57
End: 2022-04-04

## 2022-04-08 ENCOUNTER — TRANSCRIPTION ENCOUNTER (OUTPATIENT)
Age: 57
End: 2022-04-08

## 2022-04-13 ENCOUNTER — TRANSCRIPTION ENCOUNTER (OUTPATIENT)
Age: 57
End: 2022-04-13

## 2022-04-15 ENCOUNTER — TRANSCRIPTION ENCOUNTER (OUTPATIENT)
Age: 57
End: 2022-04-15

## 2022-04-26 ENCOUNTER — OUTPATIENT (OUTPATIENT)
Dept: OUTPATIENT SERVICES | Facility: HOSPITAL | Age: 57
LOS: 1 days | Discharge: HOME | End: 2022-04-26

## 2022-04-26 DIAGNOSIS — C67.9 MALIGNANT NEOPLASM OF BLADDER, UNSPECIFIED: ICD-10-CM

## 2022-04-29 ENCOUNTER — APPOINTMENT (OUTPATIENT)
Dept: HEMATOLOGY ONCOLOGY | Facility: CLINIC | Age: 57
End: 2022-04-29
Payer: COMMERCIAL

## 2022-04-29 ENCOUNTER — RESULT REVIEW (OUTPATIENT)
Age: 57
End: 2022-04-29

## 2022-04-29 VITALS
TEMPERATURE: 97.8 F | SYSTOLIC BLOOD PRESSURE: 143 MMHG | BODY MASS INDEX: 24.4 KG/M2 | WEIGHT: 176.22 LBS | OXYGEN SATURATION: 99 % | RESPIRATION RATE: 16 BRPM | DIASTOLIC BLOOD PRESSURE: 87 MMHG | HEIGHT: 71.26 IN | HEART RATE: 69 BPM

## 2022-04-29 LAB
BASOPHILS # BLD AUTO: 0.1 K/UL — SIGNIFICANT CHANGE UP (ref 0–0.2)
BASOPHILS NFR BLD AUTO: 1.2 % — SIGNIFICANT CHANGE UP (ref 0–2)
EOSINOPHIL # BLD AUTO: 0.12 K/UL — SIGNIFICANT CHANGE UP (ref 0–0.5)
EOSINOPHIL NFR BLD AUTO: 1.4 % — SIGNIFICANT CHANGE UP (ref 0–6)
HCT VFR BLD CALC: 41.9 % — SIGNIFICANT CHANGE UP (ref 39–50)
HGB BLD-MCNC: 14.4 G/DL — SIGNIFICANT CHANGE UP (ref 13–17)
IMM GRANULOCYTES NFR BLD AUTO: 0.2 % — SIGNIFICANT CHANGE UP (ref 0–1.5)
LYMPHOCYTES # BLD AUTO: 1.77 K/UL — SIGNIFICANT CHANGE UP (ref 1–3.3)
LYMPHOCYTES # BLD AUTO: 21.2 % — SIGNIFICANT CHANGE UP (ref 13–44)
MCHC RBC-ENTMCNC: 31.3 PG — SIGNIFICANT CHANGE UP (ref 27–34)
MCHC RBC-ENTMCNC: 34.4 G/DL — SIGNIFICANT CHANGE UP (ref 32–36)
MCV RBC AUTO: 91.1 FL — SIGNIFICANT CHANGE UP (ref 80–100)
MONOCYTES # BLD AUTO: 0.49 K/UL — SIGNIFICANT CHANGE UP (ref 0–0.9)
MONOCYTES NFR BLD AUTO: 5.9 % — SIGNIFICANT CHANGE UP (ref 2–14)
NEUTROPHILS # BLD AUTO: 5.86 K/UL — SIGNIFICANT CHANGE UP (ref 1.8–7.4)
NEUTROPHILS NFR BLD AUTO: 70.1 % — SIGNIFICANT CHANGE UP (ref 43–77)
NRBC # BLD: 0 /100 WBCS — SIGNIFICANT CHANGE UP (ref 0–0)
PLATELET # BLD AUTO: 276 K/UL — SIGNIFICANT CHANGE UP (ref 150–400)
RBC # BLD: 4.6 M/UL — SIGNIFICANT CHANGE UP (ref 4.2–5.8)
RBC # FLD: 13.6 % — SIGNIFICANT CHANGE UP (ref 10.3–14.5)
WBC # BLD: 8.36 K/UL — SIGNIFICANT CHANGE UP (ref 3.8–10.5)
WBC # FLD AUTO: 8.36 K/UL — SIGNIFICANT CHANGE UP (ref 3.8–10.5)

## 2022-04-29 PROCEDURE — 99215 OFFICE O/P EST HI 40 MIN: CPT

## 2022-04-29 PROCEDURE — 99072 ADDL SUPL MATRL&STAF TM PHE: CPT

## 2022-04-29 RX ORDER — CHOLECALCIFEROL (VITAMIN D3) 25 MCG
25 MCG TABLET ORAL
Refills: 0 | Status: ACTIVE | COMMUNITY

## 2022-04-29 RX ORDER — CAPECITABINE 500 MG/1
500 TABLET ORAL
Qty: 84 | Refills: 0 | Status: DISCONTINUED | COMMUNITY
Start: 2020-01-15 | End: 2022-04-29

## 2022-04-29 NOTE — REVIEW OF SYSTEMS
[Patient Intake Form Reviewed] : Patient intake form was reviewed [Negative] : Allergic/Immunologic [FreeTextEntry2] : Patient has gained ~ 2 lbs since last visit x one month. [FreeTextEntry7] : His colostomy is intact, and stool is relatively firm.  [de-identified] : mild agitation.

## 2022-04-29 NOTE — PHYSICAL EXAM
[Restricted in physically strenuous activity but ambulatory and able to carry out work of a light or sedentary nature] : Status 1- Restricted in physically strenuous activity but ambulatory and able to carry out work of a light or sedentary nature, e.g., light house work, office work [Normal] : grossly intact [de-identified] : No masses. Colostomy bag is intact.

## 2022-04-29 NOTE — CONSULT LETTER
[Dear  ___] : Dear  [unfilled], [Please see my note below.] : Please see my note below. [Sincerely,] : Sincerely, [DrAnton ___] : Dr. LOUIS [Courtesy Letter:] : I had the pleasure of seeing your patient, [unfilled], in my office today. [DrAnton  ___] : Dr. LOUIS

## 2022-04-29 NOTE — HISTORY OF PRESENT ILLNESS
[Disease: _____________________] : Disease: [unfilled] [T: ___] : T[unfilled] [N: ___] : N[unfilled] [M: ___] : M[unfilled] [AJCC Stage: ____] : AJCC Stage: [unfilled] [de-identified] : Patient with no significant past medical history presenting to the office for an initial consultation of invasive colon adenocarcinoma.\par \par He initially presented to [facility] with acute abdominal pain, which progressively worsened throughout day.\par CT abdomen/pelvis: reported rectosigmoid perforation with possible mass of uncertain etiology (Bladder, prostate, colon/rectum).  \par He was transferred to [facility] on 11/14/2019.\par \par CT was reviewed at [facility] by surgery, and there was extensive air and possible extravasation of stool around the rectosigmoid and sigmoid, adjacent loops of thickened small bowel, free fluid in pelvis, locules of air above liver.\par Accordingly, he was resuscitated and taken urgently to the operating room for exploratory laparotomy, Vahe's procedure, abdominal washout and colostomy with Dr. Zulema Diego. There was an obstructing mass at high rectum with large proximal perforation (Approximately 75% circumferential separation of the colon wall) with feculent peritonitis.\par \par Pt was discharged on 12/6/2019. Pt reports that he had CAT chest and brain MRI which reportedly did NOT show distant metastatic disease. \par \par Surgical Pathology (Read at [place], pending re-review at [facility]).\par A) Rectosigmoid: Invasive adenocarcinoma, moderately differentiated, invading through the muscularis propria into pericolorectal tissue.\par Lymphovascular invasion present (read as suspicious).\par Perineural Invasion: Not identified.\par Tumor Budding: Intermediate score.\par Perforation site located in the uninvolved colon 1.5 cm proximal to the obstructing tumor and is negative for carcinoma.\par Margins are negative for dysplasia and carcinoma.  \par Sixteen benign lymph node (0/16).\par \par B) Portion of sigmoid colon:\par Negative for malignancy.\par MLH1, MSH2, MSH6, PMS2: Intact nuclear expression.\par pT3 pN0\par \par 2/20/2020: Patient is here for follow up for stage 2 rectal CA.  He was started on adjuvant PO Capecitabine.  C1D1 was started on 2/3/2020.  He is tolerating tablets extremely well and reports no significant adverse events.  He denies fever, chills, mouth sores, diarrhea, change in taste or HFS.  He continues to remain active and has no restrictions with his ADLs.  He is working full time.\par \par 4/29/2020: TELEHEATH Video conference performed on 4/29/2020 with Mr. Mccullough.  Verbal consent obtained.\par 1) Rectal CA: Patient is currently on adjuvant PO Xeloda.  Patient started C4 on 4/27/2020.  As per patient he's tolerating medication moderately well.  Admits to mild HFS located on his foot and not hands.  States foot are mildly swollen and red.  No blistering or lacerations noted.  He is able to ambulate without any restrictions.  \par Patient denies fever, chills, mouth sores or chest pain.  Admits to intermittent diarrhea ~ 1-2 per day however no diarrhea today. \par We reviewed blood work done this week at [facility] [employer] which was unremarkable.  CEA < 0.5\par 2) Agitation:While on the tablets patient feels he’s having issues with sleep and is more irritable. A friend of his suggested THC gummies cause she was having the same issues while on treatment which worked.He was also inquiring about medical marijuana card.\par \par 7/14/2020:\par 1) Rectal CA: Patient is currently on adjuvant PO Xeloda.  Patient started C1D1 on 2/3/2020.  As per patient he's tolerating medication well. \par Patient denies fever, chills, mouth sores or chest pain.  Admits to intermittent diarrhea ~ 1-2 per day however no diarrhea today. \par We reviewed blood work done this week at [facility] which was unremarkable.  CEA < 0.5\par Will meet head of colon surgery at [facility]. \par 2) Agitation:While on the tablets patient feels he’s having issues with sleep and is more irritable. A friend of his suggested THC gummies with relief.\par \par 4/29/2022\par Patients surgeon Dr Escobar and I discussed that she had been following patient's Signatera testing.\par Had been negative 11/1/2021\par Started to become positive 3/1/2022 (0.06 mean tumor molecules per mL (MTM/mL))\par On 4/1/2022 increased to 0.12 MTM/mL\par This is very concerning for possible early relapse of his colorectal cancer.\par Most recent CT C/A/P scan 4/11/22 is reported as negative.\par Last colonoscopy 10/6/2021 with Dr Thelma Leger is negative.\par Will advise PET/CT to look for subtle signs of early relapse. [de-identified] : adenocarcinoma [de-identified] : Surgery at [place]: Dannielle Escobar\par PCP: Jovanni Schilling; [phone]\par GI:  Thelma Leger\par \par Patient cell [phone]\par Wife (Kristyn) [phone]

## 2022-05-05 LAB
FULL GENE SEQUENCE RESULT: NORMAL
INTERPRETATION PGDFRB: NORMAL
Lab: NEGATIVE
REF LAB TEST METHOD: NORMAL
REVIEWED BY: NORMAL
TA REPEAT RESULT: NORMAL
TEST PERFORMANCE INFO SPEC: NORMAL

## 2022-05-13 ENCOUNTER — APPOINTMENT (OUTPATIENT)
Dept: NUCLEAR MEDICINE | Facility: CLINIC | Age: 57
End: 2022-05-13
Payer: COMMERCIAL

## 2022-05-13 ENCOUNTER — OUTPATIENT (OUTPATIENT)
Dept: OUTPATIENT SERVICES | Facility: HOSPITAL | Age: 57
LOS: 1 days | End: 2022-05-13

## 2022-05-13 DIAGNOSIS — C20 MALIGNANT NEOPLASM OF RECTUM: ICD-10-CM

## 2022-05-13 PROCEDURE — 78815 PET IMAGE W/CT SKULL-THIGH: CPT | Mod: 26,PI

## 2022-05-15 ENCOUNTER — NON-APPOINTMENT (OUTPATIENT)
Age: 57
End: 2022-05-15

## 2022-05-17 ENCOUNTER — NON-APPOINTMENT (OUTPATIENT)
Age: 57
End: 2022-05-17

## 2022-11-04 ENCOUNTER — NON-APPOINTMENT (OUTPATIENT)
Age: 57
End: 2022-11-04

## 2023-01-07 ENCOUNTER — NON-APPOINTMENT (OUTPATIENT)
Age: 58
End: 2023-01-07

## 2023-02-02 ENCOUNTER — NON-APPOINTMENT (OUTPATIENT)
Age: 58
End: 2023-02-02

## 2023-07-20 ENCOUNTER — TRANSCRIPTION ENCOUNTER (OUTPATIENT)
Age: 58
End: 2023-07-20

## 2023-08-03 ENCOUNTER — OUTPATIENT (OUTPATIENT)
Dept: OUTPATIENT SERVICES | Facility: HOSPITAL | Age: 58
LOS: 1 days | Discharge: HOME | End: 2023-08-03

## 2023-08-03 DIAGNOSIS — C67.9 MALIGNANT NEOPLASM OF BLADDER, UNSPECIFIED: ICD-10-CM

## 2023-08-04 ENCOUNTER — APPOINTMENT (OUTPATIENT)
Dept: HEMATOLOGY ONCOLOGY | Facility: CLINIC | Age: 58
End: 2023-08-04
Payer: COMMERCIAL

## 2023-08-04 VITALS
RESPIRATION RATE: 15 BRPM | HEART RATE: 60 BPM | TEMPERATURE: 97.5 F | BODY MASS INDEX: 24.82 KG/M2 | OXYGEN SATURATION: 98 % | HEIGHT: 69 IN | WEIGHT: 167.55 LBS | DIASTOLIC BLOOD PRESSURE: 77 MMHG | SYSTOLIC BLOOD PRESSURE: 119 MMHG

## 2023-08-04 DIAGNOSIS — C20 MALIGNANT NEOPLASM OF RECTUM: ICD-10-CM

## 2023-08-04 PROCEDURE — 99215 OFFICE O/P EST HI 40 MIN: CPT

## 2023-08-04 NOTE — PHYSICAL EXAM
[Restricted in physically strenuous activity but ambulatory and able to carry out work of a light or sedentary nature] : Status 1- Restricted in physically strenuous activity but ambulatory and able to carry out work of a light or sedentary nature, e.g., light house work, office work [Normal] : affect appropriate [de-identified] : No masses. Colostomy bag is intact.

## 2023-08-04 NOTE — CONSULT LETTER
[Dear  ___] : Dear  [unfilled], [Courtesy Letter:] : I had the pleasure of seeing your patient, [unfilled], in my office today. [Please see my note below.] : Please see my note below. [Sincerely,] : Sincerely, [DrAnton  ___] : Dr. LOUIS [DrAnton ___] : Dr. LOUIS

## 2023-08-04 NOTE — REVIEW OF SYSTEMS
[Patient Intake Form Reviewed] : Patient intake form was reviewed [Negative] : Allergic/Immunologic [FreeTextEntry2] : Patient has gained ~ 2 lbs since last visit x one month. [FreeTextEntry7] : His colostomy is intact, and stool is relatively firm.  [de-identified] : mild agitation.

## 2023-08-04 NOTE — ASSESSMENT
[FreeTextEntry1] : Patient has T3N0 high rectal adenocarcinoma resected on 11/14/2019. Emergency surgery performed for perforation proximal to the rectal lesion. Perforation located in low sigmoid colon. Only high risk feature present in tumor is "suspicious" for lymphovascular invasion. 16 lymph nodes are negative for disease involvement.   C1D1 Xeloda 2/3/2020. Completes 8/2/2020. Signatera ct-DNA is newly elevated since 3/2022 and on 4/1/2022 increased further.  Last colonoscopy 10/6/2021 with Dr Thelma Leger is negative. Reports, labs, and scans reviewed. Outside scans to be loaded.   We discussed various scenarios of potential relapse and positive or negative PET results. Observation, chemotherapy escalation to XELOX or FFX could be considered in the MRD setting. Prospective clinical trial data is limited.   Patient previously sent MRI from 12/27/22 Shows enhancing lesion at rectosigmoid anastomosis 1.7 x 1 cm Also a nodule in presacral facia measuring 1 x 0.7 cm at the level of S3. These lesions are slightly increased in size since 6/22/22 MRI. These are both PET avid on PET from 12/14/22. Case reviewed, films reviewed with radiology and patient called. Patient has started with another medical oncologist (Dr. Get Fletcher). He started FOLFOX on 1/18/23 - 4/14/23 (7 cycles), with oxali 85 mg/m2, , 5FU , and 5FU IVCI 2400.  Then, radiation to 5040 cGy + Xeloda 1650 mg q12 (radiation oncologist - Jose Daniel Garza at [facility]) He completed this on 6/15/23. He has peripheral neuropathy that is persistent and worsened after completion. He has Lhermittes sign at present. PET was negative 4/2023 from chemotherapy alone (prior to radiation). Another PET was done 7/26/23 and is also negative.  MRI 8 weeks after radiation for optimal response assessment is pending. Signatera returned to 0.00 on 1/27/23 and 7/7/23; and it converted to 0.00 after 1 dose of FOLFOX.  For surveillance would continue with flex sig and MRI q 6 months, as well as Signatera q 3 months. If future ct DNA positive can consider TODD -7P KRAS vaccine trial.  Send me MRI and ctDNA as available and keep in contact.  Time = 45 min

## 2023-08-04 NOTE — HISTORY OF PRESENT ILLNESS
[Disease: _____________________] : Disease: [unfilled] [T: ___] : T[unfilled] [N: ___] : N[unfilled] [M: ___] : M[unfilled] [AJCC Stage: ____] : AJCC Stage: [unfilled] [de-identified] : Patient with no significant past medical history presenting to the office for an initial consultation of invasive colon adenocarcinoma.  He initially presented to [facility] with acute abdominal pain, which progressively worsened throughout day. CT abdomen/pelvis: reported rectosigmoid perforation with possible mass of uncertain etiology (Bladder, prostate, colon/rectum).   He was transferred to [facility] on 11/14/2019.  CT was reviewed at [facility] by surgery, and there was extensive air and possible extravasation of stool around the rectosigmoid and sigmoid, adjacent loops of thickened small bowel, free fluid in pelvis, locules of air above liver. Accordingly, he was resuscitated and taken urgently to the operating room for exploratory laparotomy, Vahe's procedure, abdominal washout and colostomy with Dr. Zulema Diego. There was an obstructing mass at high rectum with large proximal perforation (Approximately 75% circumferential separation of the colon wall) with feculent peritonitis.  Pt was discharged on 12/6/2019. Pt reports that he had CAT chest and brain MRI which reportedly did NOT show distant metastatic disease.   Surgical Pathology (Read at [place], pending re-review at [facility]). A) Rectosigmoid: Invasive adenocarcinoma, moderately differentiated, invading through the muscularis propria into pericolorectal tissue. Lymphovascular invasion present (read as suspicious). Perineural Invasion: Not identified. Tumor Budding: Intermediate score. Perforation site located in the uninvolved colon 1.5 cm proximal to the obstructing tumor and is negative for carcinoma. Margins are negative for dysplasia and carcinoma.   Sixteen benign lymph node (0/16).  B) Portion of sigmoid colon: Negative for malignancy. MLH1, MSH2, MSH6, PMS2: Intact nuclear expression. pT3 pN0  2/20/2020: Patient is here for follow up for stage 2 rectal CA.  He was started on adjuvant PO Capecitabine.  C1D1 was started on 2/3/2020.  He is tolerating tablets extremely well and reports no significant adverse events.  He denies fever, chills, mouth sores, diarrhea, change in taste or HFS.  He continues to remain active and has no restrictions with his ADLs.  He is working full time.  4/29/2020: TELEHEATH Video conference performed on 4/29/2020 with Mr. Mccullough.  Verbal consent obtained. 1) Rectal CA: Patient is currently on adjuvant PO Xeloda.  Patient started C4 on 4/27/2020.  As per patient he's tolerating medication moderately well.  Admits to mild HFS located on his foot and not hands.  States foot are mildly swollen and red.  No blistering or lacerations noted.  He is able to ambulate without any restrictions.   Patient denies fever, chills, mouth sores or chest pain.  Admits to intermittent diarrhea ~ 1-2 per day however no diarrhea today.  We reviewed blood work done this week at [email or web address] which was unremarkable.  CEA < 0.5 2) Agitation:While on the tablets patient feels he's having issues with sleep and is more irritable. A friend of his suggested THC gummies cause she was having the same issues while on treatment which worked.He was also inquiring about medical marijuana card.  7/14/2020: 1) Rectal CA: Patient is currently on adjuvant PO Xeloda.  Patient started C1D1 on 2/3/2020.  As per patient he's tolerating medication well.  Patient denies fever, chills, mouth sores or chest pain.  Admits to intermittent diarrhea ~ 1-2 per day however no diarrhea today.  We reviewed blood work done this week at [email or web address] which was unremarkable.  CEA < 0.5 Will meet head of colon surgery at [facility].  2) Agitation:While on the tablets patient feels he's having issues with sleep and is more irritable. A friend of his suggested THC gummies with relief.  4/29/2022 Patients surgeon Dr Escobar and I discussed that she had been following patient's Signatera testing. Had been negative 11/1/2021 Started to become positive 3/1/2022 (0.06 mean tumor molecules per mL (MTM/mL)) On 4/1/2022 increased to 0.12 MTM/mL This is very concerning for possible early relapse of his colorectal cancer. Most recent CT C/A/P scan 4/11/22 is reported as negative. Last colonoscopy 10/6/2021 with Dr Thelma Leger is negative. Will advise PET/CT to look for subtle signs of early relapse.  8/4/23 Patient previously sent MRI from 12/27/22 Shows enhancing lesion at rectosigmoid anastomosis 1.7 x 1 cm Also a nodule in presacral facia measuring 1 x 0.7 cm at the level of S3. These lesions are slightly increased in size since 6/22/22 MRI. These are both PET avid on PET from 12/14/22. Case reviewed, films reviewed with radiology and patient called. Patient has started with another medical oncologist (Dr. Get Fletcher). He started FOLFOX on 1/18/23 - 4/14/23 (7 cycles), with oxali 85 mg/m2, , 5FU , and 5FU IVCI 2400.  Then, radiation to 5040 cGy + Xeloda 1650 mg q12 (radiation oncologist - Jose Daniel Garza at [facility]) He completed this on 6/15/23. He has peripheral neuropathy that is persistent and worsened after completion. He has Lhermittes sign at present. PET was negative 4/2023 from chemotherapy alone (prior to radiation). Another PET was done 7/26/23 and is also negative.  MRI 8 weeks after radiation for optimal response assessment is pending. Signatera returned to 0.00 on 1/27/23 and 7/7/23; and it converted to 0.00 after 1 dose of FOLFOX.  For surveillance would continue with flex sig and MRI q 6 months, as well as Signatera q 3 months. If future ct DNA positive can consider TODD -7P KRAS vaccine trial. [de-identified] : adenocarcinoma [de-identified] : Surgery at [place]: Dannielle Escobar PCP: Jovanni Schilling; [phone] GI:  Thelma Leger Med Onc: Nahid Fletcher  Patient cell [phone] Wife (Kristyn) [phone]